# Patient Record
Sex: FEMALE | Race: WHITE
[De-identification: names, ages, dates, MRNs, and addresses within clinical notes are randomized per-mention and may not be internally consistent; named-entity substitution may affect disease eponyms.]

---

## 2018-02-06 ENCOUNTER — HOSPITAL ENCOUNTER (OUTPATIENT)
Dept: HOSPITAL 62 - WI | Age: 43
End: 2018-02-06
Attending: FAMILY MEDICINE
Payer: COMMERCIAL

## 2018-02-06 DIAGNOSIS — D24.2: ICD-10-CM

## 2018-02-06 DIAGNOSIS — D24.1: Primary | ICD-10-CM

## 2018-02-06 PROCEDURE — 77066 DX MAMMO INCL CAD BI: CPT

## 2018-02-06 PROCEDURE — 76642 ULTRASOUND BREAST LIMITED: CPT

## 2018-02-06 NOTE — WOMENS IMAGING REPORT
EXAM DESCRIPTION:  BILAT DIAGNOSTIC MAMMO W/CAD; U/S BREAST UNILAT LIMITED



COMPLETED DATE/TIME:  2/6/2018 12:10 pm; 2/6/2018 12:51 pm; 2/6/2018 12:50 pm



REASON FOR STUDY:  ABNORMAL FINDINGS; R92.8; BILAT; ABNORMAL FINDINGS; R92.8 R92.8  OTH ABN AND INCON
CLUSIVE FINDINGS ON DX IMAGING OF KANIKA



COMPARISON:  Outside mammograms 10/18/2017

Multiple previous images here, 8/11/2015, 10/2/2014, 2/26/2013, 7/24/2012



TECHNIQUE:  Cone compression craniocaudal and mediolateral oblique views of each breast recorded usin
g digital acquisition.

Bilateral 90 mediolateral views, bilateral MLO views, bilateral breast ultrasound



LIMITATIONS:  None.



FINDINGS:  RIGHT BREAST

MASSES: Multiple low-density well-circumscribed mammographic nodules are present unchanged from prior
 studies

CALCIFICATIONS: Coarse dense benign appearing calcification right upper outer quadrant associated wit
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

ARCHITECTURAL DISTORTION: None.

DEVELOPING DENSITY: None.

ASYMMETRY: None noted.

OTHER: No other significant findings.

LEFT BREAST

MASSES: Multiple low-density well-circumscribed mammographic nodules are present

CALCIFICATIONS: No new or suspicious calcifications.

ARCHITECTURAL DISTORTION: None.

DEVELOPING DENSITY: None.

ASYMMETRY: None noted.

OTHER: No other significant finding.

Read with the assistance of CAD:

.[facility] - R2 Cenova Version 1.3

.[facility] - R2 Cenova Version 1.3

.[facility] - R2 Cenova Version 2.4

.[facility] - R2 Cenova Version 2.4

.[facility] - R2  Version 9.2

Right breast ultrasound:

On the right side, multiple fibroadenomas are present as follows:

2.1.1.8 cm in size with coarse dense calcification, 10 o'clock position

1.5 cm at the 10 o'clock position

1.1 cm at the 1 to 2 o'clock position

Left breast ultrasound:

On the left side, multiple fibroadenomas are present as follows:

7 mm in size at the 9 o'clock position

10 mm in size at the 10 o'clock position

1.4 cm in size at the 1 to 2 o'clock position

1 cm in size at the 3 o'clock position

2 x 1.3 cm in size in the retroareolar region



IMPRESSION:  No mammographic or sonographic evidence for malignancy bilaterally



BREAST DENSITY:  d. The breasts are extremely dense, which lowers the sensitivity of mammography.



BIRAD:  2 Benign findings.



RECOMMENDATION:  RECOMMENDED FOLLOW UP: Please continue yearly bilateral screening in February 2019. 
 Because of extremely dense fibroglandular tissue, please consider bilateral screening tomosynthesis 
offered at [facility]

SPECIFIC INTERVENTION/IMAGING/CONSULTATION RECOMMENDED:No additional intervention/ imaging/consultati
on needed at this time.

COMMUNICATION:Patient notified by letter



COMMENT:  The patient has been notified of the results by letter per SA requirements. Additional no
tification policies are in place for contacting patient with suspicious or incomplete findings.

Quality ID #[id number]: The American College of Radiology recommends an annual screening mammogram for women
 aged 40 years or over. This facility utilizes a reminder system to ensure that all patients receive 
reminder letters, and/or direct phone calls for appointments. This includes reminders for routine scr
eening mammograms, diagnostic mammograms, or other Breast Imaging Interventions when appropriate.  Th
is patient will be placed in the appropriate reminder system.

The American College of Radiology (ACR) has developed recommendations for screening MRI of the breast
s in certain patient populations, to be used in conjunction with mammography.  Breast MRI surveillanc
e may be appropriate for women with more than 20% lifetime risk of developing breast cancer  as deter
mined by genetic testing, significant family history of the disease, or history of mantle radiation f
or Hodgkins Disease.  ACR Practice Guidelines 2008.



TECHNICAL DOCUMENTATION:  FINDING NUMBER: (1)

ASSESSMENT: (1)

JOB ID:  [id number]

 2011 [employer]- All Rights Reserved

## 2018-02-06 NOTE — WOMENS IMAGING REPORT
EXAM DESCRIPTION:  BILAT DIAGNOSTIC MAMMO W/CAD; U/S BREAST UNILAT LIMITED



COMPLETED DATE/TIME:  2/6/2018 12:10 pm; 2/6/2018 12:51 pm; 2/6/2018 12:50 pm



REASON FOR STUDY:  ABNORMAL FINDINGS; R92.8; BILAT; ABNORMAL FINDINGS; R92.8 R92.8  OTH ABN AND INCON
CLUSIVE FINDINGS ON DX IMAGING OF KANIKA



COMPARISON:  Outside mammograms 10/18/2017

Multiple previous images here, 8/11/2015, 10/2/2014, 2/26/2013, 7/24/2012



TECHNIQUE:  Cone compression craniocaudal and mediolateral oblique views of each breast recorded usin
g digital acquisition.

Bilateral 90 mediolateral views, bilateral MLO views, bilateral breast ultrasound



LIMITATIONS:  None.



FINDINGS:  RIGHT BREAST

MASSES: Multiple low-density well-circumscribed mammographic nodules are present unchanged from prior
 studies

CALCIFICATIONS: Coarse dense benign appearing calcification right upper outer quadrant associated wit
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

ARCHITECTURAL DISTORTION: None.

DEVELOPING DENSITY: None.

ASYMMETRY: None noted.

OTHER: No other significant findings.

LEFT BREAST

MASSES: Multiple low-density well-circumscribed mammographic nodules are present

CALCIFICATIONS: No new or suspicious calcifications.

ARCHITECTURAL DISTORTION: None.

DEVELOPING DENSITY: None.

ASYMMETRY: None noted.

OTHER: No other significant finding.

Read with the assistance of CAD:

.[facility] Cenova Version 1.3

.[facility] - R2 Cenova Version 1.3

.[facility] - R2 Cenova Version 2.4

.[facility] - R2 Cenova Version 2.4

.[facility] - R2  Version 9.2

Right breast ultrasound:

On the right side, multiple fibroadenomas are present as follows:

2.1.1.8 cm in size with coarse dense calcification, 10 o'clock position

1.5 cm at the 10 o'clock position

1.1 cm at the 1 to 2 o'clock position

Left breast ultrasound:

On the left side, multiple fibroadenomas are present as follows:

7 mm in size at the 9 o'clock position

10 mm in size at the 10 o'clock position

1.4 cm in size at the 1 to 2 o'clock position

1 cm in size at the 3 o'clock position

2 x 1.3 cm in size in the retroareolar region



IMPRESSION:  No mammographic or sonographic evidence for malignancy bilaterally



BREAST DENSITY:  d. The breasts are extremely dense, which lowers the sensitivity of mammography.



BIRAD:  2 Benign findings.



RECOMMENDATION:  RECOMMENDED FOLLOW UP: Please continue yearly bilateral screening in February 2019. 
 Because of extremely dense fibroglandular tissue, please consider bilateral screening tomosynthesis 
offered at [facility]

SPECIFIC INTERVENTION/IMAGING/CONSULTATION RECOMMENDED:No additional intervention/ imaging/consultati
on needed at this time.

COMMUNICATION:Patient notified by letter



COMMENT:  The patient has been notified of the results by letter per SA requirements. Additional no
tification policies are in place for contacting patient with suspicious or incomplete findings.

Quality ID #225: The American College of Radiology recommends an annual screening mammogram for women
 aged 40 years or over. This facility utilizes a reminder system to ensure that all patients receive 
reminder letters, and/or direct phone calls for appointments. This includes reminders for routine scr
eening mammograms, diagnostic mammograms, or other Breast Imaging Interventions when appropriate.  Th
is patient will be placed in the appropriate reminder system.

The American College of Radiology (ACR) has developed recommendations for screening MRI of the breast
s in certain patient populations, to be used in conjunction with mammography.  Breast MRI surveillanc
e may be appropriate for women with more than 20% lifetime risk of developing breast cancer  as deter
mined by genetic testing, significant family history of the disease, or history of mantle radiation f
or Hodgkins Disease.  ACR Practice Guidelines 2008.



TECHNICAL DOCUMENTATION:  FINDING NUMBER: (1)

ASSESSMENT: (1)

JOB ID:  [id number]

 2011 [employer]- All Rights Reserved

## 2018-02-06 NOTE — WOMENS IMAGING REPORT
EXAM DESCRIPTION:  BILAT DIAGNOSTIC MAMMO W/CAD; U/S BREAST UNILAT LIMITED



COMPLETED DATE/TIME:  2/6/2018 12:10 pm; 2/6/2018 12:51 pm; 2/6/2018 12:50 pm



REASON FOR STUDY:  ABNORMAL FINDINGS; R92.8; BILAT; ABNORMAL FINDINGS; R92.8 R92.8  OTH ABN AND INCON
CLUSIVE FINDINGS ON DX IMAGING OF KANIKA



COMPARISON:  Outside mammograms 10/18/2017

Multiple previous images here, 8/11/2015, 10/2/2014, 2/26/2013, 7/24/2012



TECHNIQUE:  Cone compression craniocaudal and mediolateral oblique views of each breast recorded usin
g digital acquisition.

Bilateral 90 mediolateral views, bilateral MLO views, bilateral breast ultrasound



LIMITATIONS:  None.



FINDINGS:  RIGHT BREAST

MASSES: Multiple low-density well-circumscribed mammographic nodules are present unchanged from prior
 studies

CALCIFICATIONS: Coarse dense benign appearing calcification right upper outer quadrant associated wit
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

ARCHITECTURAL DISTORTION: None.

DEVELOPING DENSITY: None.

ASYMMETRY: None noted.

OTHER: No other significant findings.

LEFT BREAST

MASSES: Multiple low-density well-circumscribed mammographic nodules are present

CALCIFICATIONS: No new or suspicious calcifications.

ARCHITECTURAL DISTORTION: None.

DEVELOPING DENSITY: None.

ASYMMETRY: None noted.

OTHER: No other significant finding.

Read with the assistance of CAD:

.[facility] Cenova Version 1.3

.[facility] - R2 Cenova Version 1.3

.[facility] - R2 Cenova Version 2.4

.[facility] - R2 Cenova Version 2.4

.[facility] - R2  Version 9.2

Right breast ultrasound:

On the right side, multiple fibroadenomas are present as follows:

2.1.1.8 cm in size with coarse dense calcification, 10 o'clock position

1.5 cm at the 10 o'clock position

1.1 cm at the 1 to 2 o'clock position

Left breast ultrasound:

On the left side, multiple fibroadenomas are present as follows:

7 mm in size at the 9 o'clock position

10 mm in size at the 10 o'clock position

1.4 cm in size at the 1 to 2 o'clock position

1 cm in size at the 3 o'clock position

2 x 1.3 cm in size in the retroareolar region



IMPRESSION:  No mammographic or sonographic evidence for malignancy bilaterally



BREAST DENSITY:  d. The breasts are extremely dense, which lowers the sensitivity of mammography.



BIRAD:  2 Benign findings.



RECOMMENDATION:  RECOMMENDED FOLLOW UP: Please continue yearly bilateral screening in February 2019. 
 Because of extremely dense fibroglandular tissue, please consider bilateral screening tomosynthesis 
offered at [facility]

SPECIFIC INTERVENTION/IMAGING/CONSULTATION RECOMMENDED:No additional intervention/ imaging/consultati
on needed at this time.

COMMUNICATION:Patient notified by letter



COMMENT:  The patient has been notified of the results by letter per SA requirements. Additional no
tification policies are in place for contacting patient with suspicious or incomplete findings.

Quality ID #225: The American College of Radiology recommends an annual screening mammogram for women
 aged 40 years or over. This facility utilizes a reminder system to ensure that all patients receive 
reminder letters, and/or direct phone calls for appointments. This includes reminders for routine scr
eening mammograms, diagnostic mammograms, or other Breast Imaging Interventions when appropriate.  Th
is patient will be placed in the appropriate reminder system.

The American College of Radiology (ACR) has developed recommendations for screening MRI of the breast
s in certain patient populations, to be used in conjunction with mammography.  Breast MRI surveillanc
e may be appropriate for women with more than 20% lifetime risk of developing breast cancer  as deter
mined by genetic testing, significant family history of the disease, or history of mantle radiation f
or Hodgkins Disease.  ACR Practice Guidelines 2008.



TECHNICAL DOCUMENTATION:  FINDING NUMBER: (1)

ASSESSMENT: (1)

JOB ID:  [id number]

 2011 [employer]- All Rights Reserved

## 2019-08-23 ENCOUNTER — HOSPITAL ENCOUNTER (OUTPATIENT)
Dept: HOSPITAL 62 - WI | Age: 44
End: 2019-08-23
Attending: FAMILY MEDICINE
Payer: COMMERCIAL

## 2019-08-23 DIAGNOSIS — Z12.31: Primary | ICD-10-CM

## 2019-08-23 PROCEDURE — 77067 SCR MAMMO BI INCL CAD: CPT

## 2019-08-23 NOTE — WOMENS IMAGING REPORT
EXAM DESCRIPTION:  BILAT SCREENING MAMMO W/CAD



COMPLETED DATE/TIME:  8/23/2019 9:30 am



REASON FOR STUDY:  Z12.31 SCREENING MAMMO Z12.31  ENCNTR SCREEN MAMMOGRAM FOR MALIGNANT NEOPLASM OF B
RE



COMPARISON:  Multiple since 2012



EXAM PARAMETERS:  Standard craniocaudal and mediolateral oblique views of each breast recorded using 
digital acquisition.

Read with the assistance of CAD.

.[employer]  Version 9.2



LIMITATIONS:  None.



FINDINGS:  Findings present which are benign by mammographic criteria.  No suspicious masses, calcifi
cations or architectural distortion.

Pertinent benign findings: Benign bilateral breast cysts and fibroadenomas.  Benign bilateral breast 
parenchymal calcifications.

Benign mammographic findings may include one or more of the following:  Smooth masses, popcorn/rim/co
arse calcifications, asymmetries, post-procedure changes, and lesions with long-standing stability.



IMPRESSION:  BENIGN MAMMOGRAPHIC FINDINGS.  BIRADS 2



BREAST DENSITY:  c. The breasts are heterogeneously dense, which may obscure small masses.



BIRAD:  ASSESSMENT:  2 BENIGN FINDING(S)



RECOMMENDATION:  ROUTINE SCREENING



COMMENT:  The patient has been notified of the results by letter per MQSA requirements. Additional no
tification policies are in place for contacting patient with suspicious or incomplete findings.

Quality ID #225: The American College of [employer]iology recommends an annual screening mammogram for women
 aged 40 years or over. This facility utilizes a reminder system to ensure that all patients receive 
reminder letters, and/or direct phone calls for appointments. This includes reminders for routine scr
eening mammograms, diagnostic mammograms, or other Breast Imaging Interventions when appropriate.  Th
is patient will be placed in the appropriate reminder system.



TECHNICAL DOCUMENTATION:  FINDING NUMBER: (1)

ASSESSMENT: (1)

JOB ID:  [id number]

 2011 [employer]- All Rights Reserved



Reading location - IP/workstation name: [id number]